# Patient Record
Sex: MALE | Race: WHITE | NOT HISPANIC OR LATINO | Employment: OTHER | URBAN - METROPOLITAN AREA
[De-identification: names, ages, dates, MRNs, and addresses within clinical notes are randomized per-mention and may not be internally consistent; named-entity substitution may affect disease eponyms.]

---

## 2018-12-10 ENCOUNTER — APPOINTMENT (OUTPATIENT)
Dept: CT IMAGING | Facility: CLINIC | Age: 68
End: 2018-12-10
Attending: EMERGENCY MEDICINE
Payer: MEDICARE

## 2018-12-10 ENCOUNTER — HOSPITAL ENCOUNTER (EMERGENCY)
Facility: CLINIC | Age: 68
Discharge: HOME | End: 2018-12-10
Attending: EMERGENCY MEDICINE | Admitting: EMERGENCY MEDICINE
Payer: MEDICARE

## 2018-12-10 ENCOUNTER — APPOINTMENT (OUTPATIENT)
Dept: GENERAL RADIOLOGY | Facility: CLINIC | Age: 68
End: 2018-12-10
Attending: EMERGENCY MEDICINE
Payer: MEDICARE

## 2018-12-10 VITALS
DIASTOLIC BLOOD PRESSURE: 70 MMHG | SYSTOLIC BLOOD PRESSURE: 110 MMHG | OXYGEN SATURATION: 100 % | WEIGHT: 174.16 LBS | TEMPERATURE: 98 F | HEART RATE: 71 BPM | RESPIRATION RATE: 16 BRPM

## 2018-12-10 DIAGNOSIS — N28.9 RENAL INSUFFICIENCY: ICD-10-CM

## 2018-12-10 DIAGNOSIS — R55 SYNCOPE, UNSPECIFIED SYNCOPE TYPE: ICD-10-CM

## 2018-12-10 DIAGNOSIS — J18.9 PNEUMONIA OF LEFT LUNG DUE TO INFECTIOUS ORGANISM, UNSPECIFIED PART OF LUNG: ICD-10-CM

## 2018-12-10 DIAGNOSIS — E86.0 DEHYDRATION: ICD-10-CM

## 2018-12-10 LAB
ALBUMIN UR-MCNC: NEGATIVE MG/DL
ANION GAP SERPL CALCULATED.3IONS-SCNC: 7 MMOL/L (ref 3–14)
APPEARANCE UR: CLEAR
BASOPHILS # BLD AUTO: 0 10E9/L (ref 0–0.2)
BASOPHILS NFR BLD AUTO: 0.2 %
BILIRUB UR QL STRIP: NEGATIVE
BUN SERPL-MCNC: 21 MG/DL (ref 7–30)
CALCIUM SERPL-MCNC: 9.1 MG/DL (ref 8.5–10.1)
CHLORIDE SERPL-SCNC: 101 MMOL/L (ref 94–109)
CO2 SERPL-SCNC: 24 MMOL/L (ref 20–32)
COLOR UR AUTO: YELLOW
CREAT SERPL-MCNC: 1.63 MG/DL (ref 0.66–1.25)
D DIMER PPP FEU-MCNC: 5.1 UG/ML FEU (ref 0–0.5)
DIFFERENTIAL METHOD BLD: ABNORMAL
EOSINOPHIL # BLD AUTO: 0.1 10E9/L (ref 0–0.7)
EOSINOPHIL NFR BLD AUTO: 1.3 %
ERYTHROCYTE [DISTWIDTH] IN BLOOD BY AUTOMATED COUNT: 12.4 % (ref 10–15)
GFR SERPL CREATININE-BSD FRML MDRD: 42 ML/MIN/1.7M2
GLUCOSE SERPL-MCNC: 96 MG/DL (ref 70–99)
GLUCOSE UR STRIP-MCNC: NEGATIVE MG/DL
HCT VFR BLD AUTO: 39.1 % (ref 40–53)
HGB BLD-MCNC: 13.2 G/DL (ref 13.3–17.7)
HGB UR QL STRIP: NEGATIVE
HYALINE CASTS #/AREA URNS LPF: 16 /LPF (ref 0–2)
IMM GRANULOCYTES # BLD: 0 10E9/L (ref 0–0.4)
IMM GRANULOCYTES NFR BLD: 0.3 %
KETONES UR STRIP-MCNC: NEGATIVE MG/DL
LEUKOCYTE ESTERASE UR QL STRIP: ABNORMAL
LYMPHOCYTES # BLD AUTO: 0.5 10E9/L (ref 0.8–5.3)
LYMPHOCYTES NFR BLD AUTO: 5.2 %
MAGNESIUM SERPL-MCNC: 2.3 MG/DL (ref 1.6–2.3)
MCH RBC QN AUTO: 32.4 PG (ref 26.5–33)
MCHC RBC AUTO-ENTMCNC: 33.8 G/DL (ref 31.5–36.5)
MCV RBC AUTO: 96 FL (ref 78–100)
MONOCYTES # BLD AUTO: 1.1 10E9/L (ref 0–1.3)
MONOCYTES NFR BLD AUTO: 11.4 %
MUCOUS THREADS #/AREA URNS LPF: PRESENT /LPF
NEUTROPHILS # BLD AUTO: 7.6 10E9/L (ref 1.6–8.3)
NEUTROPHILS NFR BLD AUTO: 81.6 %
NITRATE UR QL: NEGATIVE
NRBC # BLD AUTO: 0 10*3/UL
NRBC BLD AUTO-RTO: 0 /100
NT-PROBNP SERPL-MCNC: 77 PG/ML (ref 0–900)
PH UR STRIP: 6 PH (ref 5–7)
PLATELET # BLD AUTO: 252 10E9/L (ref 150–450)
POTASSIUM SERPL-SCNC: 5.2 MMOL/L (ref 3.4–5.3)
RBC # BLD AUTO: 4.07 10E12/L (ref 4.4–5.9)
RBC #/AREA URNS AUTO: 1 /HPF (ref 0–2)
SODIUM SERPL-SCNC: 132 MMOL/L (ref 133–144)
SOURCE: ABNORMAL
SP GR UR STRIP: 1.01 (ref 1–1.03)
SQUAMOUS #/AREA URNS AUTO: <1 /HPF (ref 0–1)
TROPONIN I BLD-MCNC: 0.01 UG/L (ref 0–0.08)
TROPONIN I SERPL-MCNC: <0.015 UG/L (ref 0–0.04)
UROBILINOGEN UR STRIP-MCNC: 0 MG/DL (ref 0–2)
WBC # BLD AUTO: 9.3 10E9/L (ref 4–11)
WBC #/AREA URNS AUTO: 12 /HPF (ref 0–5)

## 2018-12-10 PROCEDURE — 96361 HYDRATE IV INFUSION ADD-ON: CPT

## 2018-12-10 PROCEDURE — 84484 ASSAY OF TROPONIN QUANT: CPT | Performed by: EMERGENCY MEDICINE

## 2018-12-10 PROCEDURE — 25000128 H RX IP 250 OP 636: Performed by: EMERGENCY MEDICINE

## 2018-12-10 PROCEDURE — 85025 COMPLETE CBC W/AUTO DIFF WBC: CPT | Performed by: EMERGENCY MEDICINE

## 2018-12-10 PROCEDURE — 71260 CT THORAX DX C+: CPT

## 2018-12-10 PROCEDURE — 83880 ASSAY OF NATRIURETIC PEPTIDE: CPT | Performed by: EMERGENCY MEDICINE

## 2018-12-10 PROCEDURE — 70450 CT HEAD/BRAIN W/O DYE: CPT

## 2018-12-10 PROCEDURE — 93005 ELECTROCARDIOGRAM TRACING: CPT

## 2018-12-10 PROCEDURE — 87086 URINE CULTURE/COLONY COUNT: CPT | Performed by: EMERGENCY MEDICINE

## 2018-12-10 PROCEDURE — 80048 BASIC METABOLIC PNL TOTAL CA: CPT | Performed by: EMERGENCY MEDICINE

## 2018-12-10 PROCEDURE — 81001 URINALYSIS AUTO W/SCOPE: CPT | Performed by: EMERGENCY MEDICINE

## 2018-12-10 PROCEDURE — 85379 FIBRIN DEGRADATION QUANT: CPT | Performed by: EMERGENCY MEDICINE

## 2018-12-10 PROCEDURE — 96360 HYDRATION IV INFUSION INIT: CPT | Mod: 59

## 2018-12-10 PROCEDURE — 71046 X-RAY EXAM CHEST 2 VIEWS: CPT

## 2018-12-10 PROCEDURE — 99285 EMERGENCY DEPT VISIT HI MDM: CPT | Mod: 25

## 2018-12-10 PROCEDURE — 84484 ASSAY OF TROPONIN QUANT: CPT

## 2018-12-10 PROCEDURE — 83735 ASSAY OF MAGNESIUM: CPT | Performed by: EMERGENCY MEDICINE

## 2018-12-10 RX ORDER — LISINOPRIL 10 MG/1
10 TABLET ORAL
COMMUNITY
Start: 2018-10-02

## 2018-12-10 RX ORDER — FLUTICASONE PROPIONATE 50 MCG
2 SPRAY, SUSPENSION (ML) NASAL
COMMUNITY
Start: 2016-04-01

## 2018-12-10 RX ORDER — MULTIVIT-MINERALS/FOLIC ACID 200 MCG
TABLET,CHEWABLE ORAL
COMMUNITY
Start: 2016-05-07

## 2018-12-10 RX ORDER — ASPIRIN 81 MG/1
81 TABLET, CHEWABLE ORAL
COMMUNITY
Start: 2016-05-07

## 2018-12-10 RX ORDER — ALBUTEROL SULFATE 90 UG/1
2 AEROSOL, METERED RESPIRATORY (INHALATION)
COMMUNITY
Start: 2018-08-03

## 2018-12-10 RX ORDER — LEVOFLOXACIN 750 MG/1
750 TABLET, FILM COATED ORAL DAILY
Qty: 5 TABLET | Refills: 0 | Status: SHIPPED | OUTPATIENT
Start: 2018-12-10 | End: 2018-12-15

## 2018-12-10 RX ORDER — OMEPRAZOLE 40 MG/1
40 CAPSULE, DELAYED RELEASE ORAL
COMMUNITY
Start: 2018-10-02

## 2018-12-10 RX ORDER — ATORVASTATIN CALCIUM 20 MG/1
20 TABLET, FILM COATED ORAL
COMMUNITY
Start: 2018-10-02

## 2018-12-10 RX ORDER — IOPAMIDOL 755 MG/ML
500 INJECTION, SOLUTION INTRAVASCULAR ONCE
Status: COMPLETED | OUTPATIENT
Start: 2018-12-10 | End: 2018-12-10

## 2018-12-10 RX ADMIN — SODIUM CHLORIDE 91 ML: 9 INJECTION, SOLUTION INTRAVENOUS at 20:24

## 2018-12-10 RX ADMIN — SODIUM CHLORIDE 1000 ML: 9 INJECTION, SOLUTION INTRAVENOUS at 18:38

## 2018-12-10 RX ADMIN — SODIUM CHLORIDE 1000 ML: 9 INJECTION, SOLUTION INTRAVENOUS at 20:58

## 2018-12-10 RX ADMIN — IOPAMIDOL 72 ML: 755 INJECTION, SOLUTION INTRAVENOUS at 20:24

## 2018-12-10 ASSESSMENT — ENCOUNTER SYMPTOMS
NUMBNESS: 0
CHILLS: 0
ACTIVITY CHANGE: 1
DYSURIA: 0
NAUSEA: 1
ABDOMINAL PAIN: 0
VOMITING: 0
WEAKNESS: 0
SEIZURES: 0
HEADACHES: 0
SHORTNESS OF BREATH: 0
BLOOD IN STOOL: 0
APPETITE CHANGE: 1
PALPITATIONS: 0
DIFFICULTY URINATING: 0
FATIGUE: 1
FEVER: 1

## 2018-12-10 NOTE — ED PROVIDER NOTES
"  History     Chief Complaint:  Fall and loss of consciousness    HPI   Angel Moore is a 68 year old male who presents with loss of consciousness and fall. The patient states that over the past 3 previous weeks he has been traveling extensively around [place] ([place] and [place]). He returned home approximately 3 days ago and states he has felt fatigued upon returning, attributing it to jet lag. Last night, around 2200, the patient states he had a few alcoholic drinks and when he got up to go into the living room from his kitchen he began to feel tired and mildly lightheaded. Suddenly, the patient states he lost consciousness without much warning and fell. This was not witnessed but his wife was nearby and heard a large crash. She found the patient on the ground with no reports of seizure activity. The patient believes that he struck his chin on the furniture. He came to consciousness fairly quickly and has since been ambulatory without significant symptoms such as confusion or headache. However, he has had some pain at his chin, as well as his arm where he believes he fell on top of it. They called his clinic today to schedule an appointment but was referred here given his injury. The patient denies any preceding symptoms to his syncope apart from some mild lightheadedness. He had no chest pain or shortness of breath prior to it as well.  He denies fevers, chills, cough, shortness of breath, chest pain, leg swelling, nausea, vomiting, abdominal pain, black/bloody stools or urinary symptoms. Notably, the patient endorses drinking 3-4 whiskey drinks a day. Likewise he notes that he was walking around significantly on his trip and has lost ~10 pounds of weight over the past 3 weeks.  He also notes \"the air quality there was very poor.\" No history blood thinners.    Allergies:  Erythromycin      Medications:    Ventolin  Aspirin  Lipitor  Flonase  Lisinopril  Omeprazole    Past Medical History:  "   COPD  Hypertension    Past Surgical History:    History reviewed. No pertinent surgical history.     Family History:    History reviewed. No pertinent family history.      Social History:  Smoking Status: Former Smoker  Alcohol Use: Yes, 3-4 drinks per night  Patient presents with wife.   Marital Status:        Review of Systems   Constitutional: Positive for activity change, appetite change, fatigue and fever. Negative for chills.   Respiratory: Negative for shortness of breath.    Cardiovascular: Negative for chest pain, palpitations and leg swelling.   Gastrointestinal: Positive for nausea. Negative for abdominal pain, blood in stool and vomiting.   Genitourinary: Negative for difficulty urinating and dysuria.   Musculoskeletal: Positive for arthralgias (arm pain).   Skin: Positive for color change (bruising to chin).   Neurological: Positive for syncope (yesterday). Negative for seizures, weakness, numbness and headaches.   All other systems reviewed and are negative.      Physical Exam   First Vitals:  BP: 108/63  Pulse: 88  Heart Rate: 77  Temp: 98  F (36.7  C)  Resp: 16  Weight: 79 kg (174 lb 2.6 oz)  SpO2: 98 %      Physical Exam  Nursing note and vitals reviewed.  Constitutional: Well nourished. Resting comfortably.   Eyes: Conjunctiva normal.  Pupils are equal, round, and reactive to light.   ENT: Nose normal. Mucous membranes pink and moist. TM normal.     Neck: Normal range of motion.  CVS: Normal rate, regular rhythm.  Normal heart sounds.  No murmur.  Pulmonary: Mild bibasilar rhonchi.  GI: Abdomen soft. Nontender, nondistended. No rigidity or guarding.    MSK: No calf tenderness or swelling. All extremities without reproducible tenderness. No c/t/l bony tenderness.   Neuro: Awake, alert. Speech is normal and fluent. Face is symmetric. EOMI. PERRL. Moves all extremities. Normal finger-nose-finger.  strength equal bilaterally. Equal sensation bilaterally on UE/LE and face. No arm or leg  drift. Gait stable   Skin: Skin is warm and dry. No rash noted. Mild bruising and soft tissue swelling to R. Lower chin  Psychiatric: Normal affect.       Emergency Department Course     ECG (16:59:20):  Rate 76 bpm. KY interval 168. QRS duration 82. QT/QTc 356/400. P-R-T axes 56 65 60. Normal sinus rhythm. Normal ECG. Interpreted by Shante Yuen DO.     Imaging:  Radiographic findings were communicated with the patient who voiced understanding of the findings.    CT-scan Chest PE  w/ contrast:  1. No pulmonary embolism demonstrated.  2. No thoracic aortic dissection or aneurysm.   3. Infiltrate in the posterolateral left upper lobe.  4. Small bilateral pulmonary nodules measuring up to 5 mm.  As read by Radiology.      X-ray Chest, 2 views:  No acute disease.   Result per radiology.     CT-scan Head w/o contrast:  No evidence of acute intracranial hemorrhage, mass, or  herniation.  Result per radiology.      Laboratory:  1838 -Troponin: <0.015   2055 -Troponin POCT: 0.01  BNP: 77  D-dimer: 5.1   CBC: HGB 13.2 (L), o/w WNL (WBC 9.3, )   BMP: Na 132 (H), GFR 42 (L), o/w WNL (Creatinine 1.63 (H)     UA: Trace leukocyte esterase, WBC 12, Mucous present, hyaline casts 16, o/w negative    Interventions:  1838 - NS 1L IV Bolus   2058 - NS 1L IV Bolus     Emergency Department Course:  Past medical records, nursing notes, and vitals reviewed.  I performed an exam of the patient and obtained history, as documented above.     IV inserted and blood drawn.     The patient was sent for a CT-scan and X-ray while in the emergency department, findings above.      2157: I rechecked the patient. Findings and plan explained to the Patient. Patient discharged home with instructions regarding supportive care, medications, and reasons to return. The importance of close follow-up was reviewed.      Impression & Plan      Medical Decision Making:  Angel Moore is a 68 year old male who presents with a history and clinical  exam consistent with syncope.  Patient underwent an extensive workup in the ED.  EKG without focal ischemia or underlying arrhythmia. Troponin neg x 2.  CT without evidence of PE though noted L. Sided infiltrate.  UA suggests mild infection, though patient denies any dysuria/abdominal pain, will defer antibiotics pending formal culture.  Labs without significant electrolyte derangements though noted acute on chronic renal insufficiency with baseline Cr around 1.2, today 1.6.  I suspect this is secondary to mild dehydration.  The patient received 2L IVF during time in the ED.  H/H stable.  There are no signs of a concerning etiology for syncope at this point.  In addition, there is no family history of sudden death, no chest pain, no seizure activity or post-ictal period, no murmur, and no focal neurologic symptoms and no complaints of concerning headache.  Patient requesting discharge home on reevaluation.  He has remained hemodynamically stable in the ED and is not hypoxic.  He will be discharged home on levoquin given pneumonia.  He is not septic appearing and is agreeable to close outpatient f/u.  I also have ordered a echo for the patient to f/u with his PCP outpatient.   Instructed to return to the ED should patient develop chest pain, weakness, or should symptoms worsen/change.        Diagnosis:    ICD-10-CM   1. Pneumonia of left lung due to infectious organism, unspecified part of lung J18.9       2. Syncope, unspecified syncope type R55   3. Renal insufficiency N28.9   4. Dehydration E86.0       Disposition:  Discharged to home    Discharge Medications:  START taking      Dose / Directions   levofloxacin 750 MG tablet  Commonly known as:  LEVAQUIN      Dose:  750 mg  Take 1 tablet (750 mg) by mouth daily for 5 days  Quantity:  5 tablet  Refills:  0          Manny Kam  12/10/2018   [facility] EMERGENCY DEPARTMENT  IManny am serving as a scribe at 9:56 PM on 12/10/2018 to  document services personally performed by Dr. Yuen based on my observations and the provider's statements to me.           Shante Yuen, DO  12/11/18 0931

## 2018-12-10 NOTE — ED AVS SNAPSHOT
[facility] Emergency Department  [address]  [facility] [address]  Phone:  [phone]  Fax:  [phone]                                    Angel Moore   MRN: [id number]    Department:  [facility] Emergency Department   Date of Visit:  12/10/2018           After Visit Summary Signature Page    I have received my discharge instructions, and my questions have been answered. I have discussed any challenges I see with this plan with the nurse or doctor.    ..........................................................................................................................................  Patient/Patient Representative Signature      ..........................................................................................................................................  Patient Representative Print Name and Relationship to Patient    ..................................................               ................................................  Date                                   Time    ..........................................................................................................................................  Reviewed by Signature/Title    ...................................................              ..............................................  Date                                               Time          22EPIC Rev 08/18

## 2018-12-10 NOTE — ED AVS SNAPSHOT
[facility] EMERGENCY DEPARTMENT: [phone]                                              INTERAGENCY TRANSFER FORM - LAB / IMAGING / EKG / EMG RESULTS   12/10/2018                   Hospital Admission Date: 12/10/2018  ALANA IZQUIERDO   : 1950  Sex: Male         Attending Provider:  Shante Yuen DO    Allergies:  Erythromycin    Infection:  None   Service:  EMERGENCY ME    Ht:  --   Wt:  79 kg (174 lb 2.6 oz)   Admission Wt:  79 kg (174 lb 2.6 oz)    BMI:  --   BSA:  --            Patient PCP Information     Provider PCP Type    [facility]         Lab Results - 3 Days      UA with Microscopic [[id number]] (Abnormal)  Resulted: 12/10/18 2107, Result status: Final result   Ordering provider:  Shante Yuen DO  12/10/18 1756 Resulting lab:  [facility]    Specimen Information    Type Source Collected On   Midstream Urine   12/10/18 1958          Components    Component Value Reference Range Flag Lab   Color Urine Yellow     FrRdHs   Appearance Urine Clear     FrRdHs   Glucose Urine Negative NEG^Negative mg/dL   FrRdHs   Bilirubin Urine Negative NEG^Negative   FrRdHs   Ketones Urine Negative NEG^Negative mg/dL   FrRdHs   Specific Gravity Urine 1.009 1.003 - 1.035   FrRdHs   Blood Urine Negative NEG^Negative   FrRdHs   pH Urine 6.0 5.0 - 7.0 pH   FrRdHs   Protein Albumin Urine Negative NEG^Negative mg/dL   FrRdHs   Urobilinogen mg/dL 0.0 0.0 - 2.0 mg/dL   FrRdHs   Nitrite Urine Negative NEG^Negative   FrRdHs   Leukocyte Esterase Urine Trace NEG^Negative A FrRdHs   Source Midstream Urine     FrRdHs   WBC Urine 12 0 - 5 /HPF H FrRdHs   RBC Urine 1 0 - 2 /HPF   FrRdHs   Squamous Epithelial /HPF Urine <1 0 - 1 /HPF   FrRdHs   Mucous Urine Present NEG^Negative /LPF A FrRdHs   Hyaline Casts 16 0 - 2 /LPF H FrRdHs            D dimer quantitative [[id number]] (Abnormal)  Resulted: 12/10/18 1951, Result status: Final result   Ordering provider:  Shante Yuen  DO  12/10/18 1756 Resulting lab:  [facility]    Specimen Information    Type Source Collected On   Blood   12/10/18 1838          Components    Component Value Reference Range Flag Lab   D Dimer 5.1 0.0 - 0.50 ug/ml FEU [facility]   Comment:         This D-dimer assay is intended for use in conjunction with a clinical pretest   probability assessment model to exclude pulmonary embolism (PE) and deep   venous thrombosis (DVT) in outpatients suspected of PE or DVT. The cut-off   value is 0.5 ug/mL FEU.              Troponin I [[id number]]  Resulted: 12/10/18 1917, Result status: Final result   Ordering provider:  Shante Yuen, DO  12/10/18 1756 Resulting lab:  [facility]    Specimen Information    Type Source Collected On   Blood   12/10/18 1838          Components    Component Value Reference Range Flag Lab   Troponin I ES <0.015 0.000 - 0.045 ug/L   [facility]   Comment:         The 99th percentile for upper reference range is 0.045 ug/L.  Troponin values   in the range of 0.045 - 0.120 ug/L may be associated with risks of adverse   clinical events.              BNP [[id number]]  Resulted: 12/10/18 1917, Result status: Final result   Ordering provider:  Shante Yuen,   12/10/18 1756 Resulting lab:  [facility]    Specimen Information    Type Source Collected On   Blood   12/10/18 1838          Components    Component Value Reference Range Flag Lab   N-Terminal Pro BNP Inpatient 77 0 - 900 pg/mL   [facility]   Comment:            Reference range shown and results flagged as abnormal are suggested inpatient   cut points for confirming diagnosis if CHF in an acute setting. Establishing a   baseline value for each individual patient is useful for follow-up. An   inpatient or emergency department NT-proPBNP <300 pg/mL effectively rules out   acute CHF, with 99% negative predictive value.  The outpatient non-acute reference range for ruling out CHF is:   0-125 pg/mL (age 18 to  less than 75)   0-450 pg/mL (age 75 yrs and older)              Magnesium [[id number]]  Resulted: 12/10/18 1917, Result status: Final result   Ordering provider:  Shante Yuen,   12/10/18 1756 Resulting lab:  [facility]    Specimen Information    Type Source Collected On   Blood   12/10/18 1838          Components    Component Value Reference Range Flag Lab   Magnesium 2.3 1.6 - 2.3 mg/dL   FrRdHs            Basic metabolic panel [[id number]] (Abnormal)  Resulted: 12/10/18 1917, Result status: Final result   Ordering provider:  Shante Yuen,   12/10/18 1756 Resulting lab:  [facility]    Specimen Information    Type Source Collected On   Blood   12/10/18 1838          Components    Component Value Reference Range Flag Lab   Sodium 132 133 - 144 mmol/L  FrRdHs   Potassium 5.2 3.4 - 5.3 mmol/L   FrRdHs   Chloride 101 94 - 109 mmol/L   FrRdHs   Carbon Dioxide 24 20 - 32 mmol/L   FrRdHs   Anion Gap 7 3 - 14 mmol/L   FrRdHs   Glucose 96 70 - 99 mg/dL   FrRdHs   Urea Nitrogen 21 7 - 30 mg/dL   FrRdHs   Creatinine 1.63 0.66 - 1.25 mg/dL H FrRdHs   GFR Estimate 42 >60 mL/min/1.7m2  FrRdHs   Comment:  Non  GFR Calc   GFR Estimate If Black 51 >60 mL/min/1.7m2  FrRdHs   Comment:  African American GFR Calc   Calcium 9.1 8.5 - 10.1 mg/dL   FrRdHs            CBC with platelets differential [[id number]] (Abnormal)  Resulted: 12/10/18 1857, Result status: Final result   Ordering provider:  Shante Yuen,   12/10/18 1756 Resulting lab:  [facility]    Specimen Information    Type Source Collected On   Blood   12/10/18 1838          Components    Component Value Reference Range Flag Lab   WBC 9.3 4.0 - 11.0 10e9/L   FrRdHs   RBC Count 4.07 4.4 - 5.9 10e12/L  FrRdHs   Hemoglobin 13.2 13.3 - 17.7 g/dL  FrRdHs   Hematocrit 39.1 40.0 - 53.0 %  FrRdHs   MCV 96 78 - 100 fl   FrRdHs   MCH 32.4 26.5 - 33.0 pg   FrRdHs   MCHC 33.8 31.5 - 36.5 g/dL   FrRdHs   RDW  12.4 10.0 - 15.0 %   FrRdHs   Platelet Count 252 150 - 450 10e9/L   FrRdHs   Diff Method Automated Method     FrRdHs   % Neutrophils 81.6 %   FrRdHs   % Lymphocytes 5.2 %   FrRdHs   % Monocytes 11.4 %   FrRdHs   % Eosinophils 1.3 %   FrRdHs   % Basophils 0.2 %   FrRdHs   % Immature Granulocytes 0.3 %   FrRdHs   Nucleated RBCs 0 0 /100   FrRdHs   Absolute Neutrophil 7.6 1.6 - 8.3 10e9/L   FrRdHs   Absolute Lymphocytes 0.5 0.8 - 5.3 10e9/L  FrRdHs   Absolute Monocytes 1.1 0.0 - 1.3 10e9/L   FrRdHs   Absolute Eosinophils 0.1 0.0 - 0.7 10e9/L   FrRdHs   Absolute Basophils 0.0 0.0 - 0.2 10e9/L   FrRdHs   Abs Immature Granulocytes 0.0 0 - 0.4 10e9/L   FrRdHs   Absolute Nucleated RBC 0.0     FrRdHs            Testing Performed By     Lab - Abbreviation Name Director Address Valid Date Range    12 - FrRdHs [facility] Unknown [address] [facility] [address] 05/08/15 1057 - Present            Unresulted Labs (24h ago, onward)    Start       Ordered    12/10/18 2130  Urine Culture Aerobic Bacterial  ADD-ON,   Add-On      12/10/18 2129    12/10/18 2129  Nt probnp inpatient  ADD-ON,   Add-On      12/10/18 2128         Imaging Results - 3 Days      CT Chest Pulmonary Embolism w Contrast [[id number]]  Resulted: 12/10/18 2108, Result status: Final result   Ordering provider:  Shante Yuen DO  12/10/18 1957 Resulted by:  Leroy Biggs MD   Performed:  12/10/18 2019 - 12/10/18 2048 Accession number:  [id number]   Resulting lab:  RADIOLOGY RESULTS   Narrative:  CT CHEST PULMONARY EMBOLISM WITH CONTRAST  12/10/2018 8:48 PM     HISTORY: Pulmonary embolism suspected, intermediate problem, positive  D-dimer. Chest pain.      COMPARISON: None.    TECHNIQUE: Volumetric helical acquisition of CT images of the chest  from the lung apices to the kidneys were acquired after the  administration of 72mL Isovue-370  IV contrast. Radiation dose for  this scan was reduced using automated exposure control,  adjustment of  the mA and/or kV according to patient size, or iterative  reconstruction technique.    FINDINGS: There is no pulmonary embolism. Emphysema. Posterior left  upper lobe infiltrate. 4 mm posterolateral left lower lobe nodule  image 169. 4 mm nodule in the posterior right upper lobe image 92.  Nodule adjacent to this, measuring 4 mm on image 97. These could be  granulomas. Nodule along the right major fissure measuring 5 mm. This  is on image 146. The heart is not enlarged. Thoracic aorta is  atherosclerotic without evidence of dissection or aneurysm. There is  no pleural or pericardial effusion. There is no pneumothorax. Adrenal  glands are normal. Remainder of the visualized upper abdomen is  unremarkable.     Impression:  IMPRESSION:   1. No pulmonary embolism demonstrated.  2. No thoracic aortic dissection or aneurysm.   3. Infiltrate in the posterolateral left upper lobe.  4. Small bilateral pulmonary nodules measuring up to 5 mm.    Recommendations for one or multiple incidental lung nodules < 6mm :    Low risk patients: No routine follow-up.    High risk patients: Optional follow-up CT at 12 months; if  unchanged, no further follow-up.    *Low Risk: Minimal or absent history of smoking or other known risk  factors.  *Nonsolid (ground glass) or partly solid nodules may require longer  follow-up to exclude indolent adenocarcinoma.  *Recommendations based on Guidelines for the Management of Incidental  Pulmonary Nodules Detected at CT: From the Fleischner Society 2017,  Radiology 2017.    NOAH BIGGS MD      XR Chest 2 Views [[id number]]  Resulted: 12/10/18 1957, Result status: Final result   Ordering provider:  Shante Yuen DO  12/10/18 1756 Resulted by:  Noah Biggs MD   Performed:  12/10/18 1921 - 12/10/18 1926 Accession number:  [id number]   Resulting lab:  RADIOLOGY RESULTS   Narrative:  CHEST TWO VIEWS 12/10/2018 7:26 PM     HISTORY: Syncope.    COMPARISON: August 7, 2014.      FINDINGS: There are no acute infiltrates. The cardiac silhouette is  not enlarged. Pulmonary vasculature is unremarkable.     Impression:  IMPRESSION: No acute disease.     NOAH CALLAHAN MD      CT Head w/o Contrast [[id number]]  Resulted: 12/10/18 1925, Result status: Final result   Ordering provider:  Shante Yuen DO  12/10/18 1756 Resulted by:  Krishna Singleton MD   Performed:  12/10/18 1912 - 12/10/18 1918 Accession number:  [id number]   Resulting lab:  RADIOLOGY RESULTS   Narrative:  CT SCAN OF THE HEAD WITHOUT CONTRAST   12/10/2018 7:18 PM     HISTORY: Altered level of consciousness; syncope.    TECHNIQUE:  Axial images of the head and coronal reformations without  IV contrast material. Radiation dose for this scan was reduced using  automated exposure control, adjustment of the mA and/or kV according  to patient size, or iterative reconstruction technique.    COMPARISON: None.    FINDINGS: There is no evidence of intracranial hemorrhage, mass, acute  infarct or anomaly. The ventricles are normal in size, shape and  configuration. The brain parenchyma and subarachnoid spaces are  normal.     The visualized portions of the sinuses and mastoids appear normal. The  bony calvarium and bones of the skull base appear intact.      Impression:  IMPRESSION:   No evidence of acute intracranial hemorrhage, mass, or  herniation.          KRISHNA SINGLETON MD      Testing Performed By     Lab - Abbreviation Name Director Address Valid Date Range    104 - Rad Rslts RADIOLOGY RESULTS Unknown Unknown 02/16/05 1553 - Present            Encounter-Level Documents:    There are no encounter-level documents.     Order-Level Documents:    There are no order-level documents.

## 2018-12-10 NOTE — ED TRIAGE NOTES
[place]ing [place] for the past 3 weeks.  Came home on Friday and has been fatigued with jet lag.  Last night pt was walking in kitchen and had a syncope.  Pt has a swelling to the lower right chin and pain in the right arm.  And hip.  Pt is alert and oriented.

## 2018-12-11 LAB — INTERPRETATION ECG - MUSE: NORMAL

## 2018-12-11 ASSESSMENT — ENCOUNTER SYMPTOMS
COLOR CHANGE: 1
ARTHRALGIAS: 1

## 2018-12-11 NOTE — RESULT ENCOUNTER NOTE
Emergency Dept discharge antibiotic (if prescribed): Levofloxacin (Levaquin) 750 mg PO tablet,  1 tablet (750 mg) by mouth once daily for 5 days. (pneumonia)  Date of Rx (if applicable):  12/10/18  No changes in treatment per Urine culture protocol.

## 2018-12-12 LAB
BACTERIA SPEC CULT: NORMAL
Lab: NORMAL
SPECIMEN SOURCE: NORMAL

## 2018-12-12 NOTE — RESULT ENCOUNTER NOTE
Final urine culture report is NEGATIVE per [place] ED Lab Result protocol.    If NEGATIVE result, no change in treatment, per [facility] ED Lab Result protocol.

## 2018-12-19 ENCOUNTER — HOSPITAL ENCOUNTER (OUTPATIENT)
Dept: CARDIOLOGY | Facility: CLINIC | Age: 68
Discharge: HOME | End: 2018-12-19
Attending: EMERGENCY MEDICINE | Admitting: EMERGENCY MEDICINE
Payer: MEDICARE

## 2018-12-19 DIAGNOSIS — R55 SYNCOPE, UNSPECIFIED SYNCOPE TYPE: ICD-10-CM

## 2018-12-19 PROCEDURE — 93018 CV STRESS TEST I&R ONLY: CPT | Performed by: INTERNAL MEDICINE

## 2018-12-19 PROCEDURE — 93325 DOPPLER ECHO COLOR FLOW MAPG: CPT | Mod: TC

## 2018-12-19 PROCEDURE — 93325 DOPPLER ECHO COLOR FLOW MAPG: CPT | Mod: 26 | Performed by: INTERNAL MEDICINE

## 2018-12-19 PROCEDURE — 93350 STRESS TTE ONLY: CPT | Mod: 26 | Performed by: INTERNAL MEDICINE

## 2018-12-19 PROCEDURE — 93016 CV STRESS TEST SUPVJ ONLY: CPT | Performed by: INTERNAL MEDICINE

## 2018-12-19 PROCEDURE — 25500064 ZZH RX 255 OP 636: Performed by: EMERGENCY MEDICINE

## 2018-12-19 PROCEDURE — 93321 DOPPLER ECHO F-UP/LMTD STD: CPT | Mod: 26 | Performed by: INTERNAL MEDICINE

## 2018-12-19 RX ADMIN — HUMAN ALBUMIN MICROSPHERES AND PERFLUTREN 6 ML: 10; .22 INJECTION, SOLUTION INTRAVENOUS at 11:07

## 2019-09-29 ENCOUNTER — HEALTH MAINTENANCE LETTER (OUTPATIENT)
Age: 69
End: 2019-09-29

## 2020-03-15 ENCOUNTER — HEALTH MAINTENANCE LETTER (OUTPATIENT)
Age: 70
End: 2020-03-15

## 2021-01-14 ENCOUNTER — HEALTH MAINTENANCE LETTER (OUTPATIENT)
Age: 71
End: 2021-01-14

## 2021-03-01 ENCOUNTER — IMMUNIZATION (OUTPATIENT)
Dept: NURSING | Facility: CLINIC | Age: 71
End: 2021-03-01
Payer: COMMERCIAL

## 2021-03-01 PROCEDURE — 91301 PR COVID VAC MODERNA 100 MCG/0.5 ML IM: CPT

## 2021-03-01 PROCEDURE — 0011A PR COVID VAC MODERNA 100 MCG/0.5 ML IM: CPT

## 2021-03-29 ENCOUNTER — IMMUNIZATION (OUTPATIENT)
Dept: NURSING | Facility: CLINIC | Age: 71
End: 2021-03-29
Attending: INTERNAL MEDICINE
Payer: COMMERCIAL

## 2021-03-29 PROCEDURE — 0012A PR COVID VAC MODERNA 100 MCG/0.5 ML IM: CPT

## 2021-03-29 PROCEDURE — 91301 PR COVID VAC MODERNA 100 MCG/0.5 ML IM: CPT

## 2021-05-08 ENCOUNTER — HEALTH MAINTENANCE LETTER (OUTPATIENT)
Age: 71
End: 2021-05-08

## 2021-08-03 PROCEDURE — 87075 CULTR BACTERIA EXCEPT BLOOD: CPT | Mod: ORL | Performed by: ORTHOPAEDIC SURGERY

## 2021-08-03 PROCEDURE — 87070 CULTURE OTHR SPECIMN AEROBIC: CPT | Mod: ORL | Performed by: ORTHOPAEDIC SURGERY

## 2021-10-23 ENCOUNTER — HEALTH MAINTENANCE LETTER (OUTPATIENT)
Age: 71
End: 2021-10-23

## 2022-02-10 ENCOUNTER — LAB REQUISITION (OUTPATIENT)
Dept: LAB | Facility: CLINIC | Age: 72
End: 2022-02-10
Payer: COMMERCIAL

## 2022-02-11 LAB
BACTERIA SPEC CULT: NO GROWTH
BACTERIA SPEC CULT: NORMAL

## 2022-05-16 ENCOUNTER — HOSPITAL ENCOUNTER (EMERGENCY)
Facility: CLINIC | Age: 72
Discharge: HOME | End: 2022-05-16
Attending: PHYSICIAN ASSISTANT | Admitting: PHYSICIAN ASSISTANT
Payer: COMMERCIAL

## 2022-05-16 ENCOUNTER — APPOINTMENT (OUTPATIENT)
Dept: GENERAL RADIOLOGY | Facility: CLINIC | Age: 72
End: 2022-05-16
Attending: PHYSICIAN ASSISTANT
Payer: COMMERCIAL

## 2022-05-16 VITALS
HEART RATE: 72 BPM | OXYGEN SATURATION: 97 % | RESPIRATION RATE: 16 BRPM | SYSTOLIC BLOOD PRESSURE: 139 MMHG | TEMPERATURE: 98 F | DIASTOLIC BLOOD PRESSURE: 79 MMHG

## 2022-05-16 DIAGNOSIS — R07.9 CHEST PAIN, UNSPECIFIED TYPE: ICD-10-CM

## 2022-05-16 DIAGNOSIS — R07.9 CHEST PAIN: ICD-10-CM

## 2022-05-16 LAB
ANION GAP SERPL CALCULATED.3IONS-SCNC: 8 MMOL/L (ref 3–14)
ATRIAL RATE - MUSE: 74 BPM
BASOPHILS # BLD AUTO: 0 10E3/UL (ref 0–0.2)
BASOPHILS NFR BLD AUTO: 0 %
BUN SERPL-MCNC: 18 MG/DL (ref 7–30)
CALCIUM SERPL-MCNC: 9.1 MG/DL (ref 8.5–10.1)
CHLORIDE BLD-SCNC: 107 MMOL/L (ref 94–109)
CO2 SERPL-SCNC: 23 MMOL/L (ref 20–32)
CREAT SERPL-MCNC: 0.99 MG/DL (ref 0.66–1.25)
D DIMER PPP FEU-MCNC: 0.3 UG/ML FEU (ref 0–0.5)
DIASTOLIC BLOOD PRESSURE - MUSE: NORMAL MMHG
EOSINOPHIL # BLD AUTO: 0.1 10E3/UL (ref 0–0.7)
EOSINOPHIL NFR BLD AUTO: 1 %
ERYTHROCYTE [DISTWIDTH] IN BLOOD BY AUTOMATED COUNT: 13.1 % (ref 10–15)
GFR SERPL CREATININE-BSD FRML MDRD: 81 ML/MIN/1.73M2
GLUCOSE BLD-MCNC: 121 MG/DL (ref 70–99)
HCT VFR BLD AUTO: 40.1 % (ref 40–53)
HGB BLD-MCNC: 13.4 G/DL (ref 13.3–17.7)
HOLD SPECIMEN: NORMAL
HOLD SPECIMEN: NORMAL
IMM GRANULOCYTES # BLD: 0.1 10E3/UL
IMM GRANULOCYTES NFR BLD: 0 %
INTERPRETATION ECG - MUSE: NORMAL
LYMPHOCYTES # BLD AUTO: 0.6 10E3/UL (ref 0.8–5.3)
LYMPHOCYTES NFR BLD AUTO: 6 %
MCH RBC QN AUTO: 32.4 PG (ref 26.5–33)
MCHC RBC AUTO-ENTMCNC: 33.4 G/DL (ref 31.5–36.5)
MCV RBC AUTO: 97 FL (ref 78–100)
MONOCYTES # BLD AUTO: 1.8 10E3/UL (ref 0–1.3)
MONOCYTES NFR BLD AUTO: 16 %
NEUTROPHILS # BLD AUTO: 8.6 10E3/UL (ref 1.6–8.3)
NEUTROPHILS NFR BLD AUTO: 77 %
NRBC # BLD AUTO: 0 10E3/UL
NRBC BLD AUTO-RTO: 0 /100
P AXIS - MUSE: 66 DEGREES
PLATELET # BLD AUTO: 168 10E3/UL (ref 150–450)
POTASSIUM BLD-SCNC: 3.8 MMOL/L (ref 3.4–5.3)
PR INTERVAL - MUSE: 172 MS
QRS DURATION - MUSE: 84 MS
QT - MUSE: 392 MS
QTC - MUSE: 435 MS
R AXIS - MUSE: 67 DEGREES
RBC # BLD AUTO: 4.13 10E6/UL (ref 4.4–5.9)
SODIUM SERPL-SCNC: 138 MMOL/L (ref 133–144)
SYSTOLIC BLOOD PRESSURE - MUSE: NORMAL MMHG
T AXIS - MUSE: 72 DEGREES
TROPONIN I SERPL HS-MCNC: 4 NG/L
TROPONIN I SERPL HS-MCNC: 4 NG/L
VENTRICULAR RATE- MUSE: 74 BPM
WBC # BLD AUTO: 11.1 10E3/UL (ref 4–11)

## 2022-05-16 PROCEDURE — 80048 BASIC METABOLIC PNL TOTAL CA: CPT | Performed by: PHYSICIAN ASSISTANT

## 2022-05-16 PROCEDURE — 85025 COMPLETE CBC W/AUTO DIFF WBC: CPT | Performed by: PHYSICIAN ASSISTANT

## 2022-05-16 PROCEDURE — 99285 EMERGENCY DEPT VISIT HI MDM: CPT | Mod: 25

## 2022-05-16 PROCEDURE — 71046 X-RAY EXAM CHEST 2 VIEWS: CPT

## 2022-05-16 PROCEDURE — 93005 ELECTROCARDIOGRAM TRACING: CPT

## 2022-05-16 PROCEDURE — 250N000013 HC RX MED GY IP 250 OP 250 PS 637: Performed by: PHYSICIAN ASSISTANT

## 2022-05-16 PROCEDURE — 36415 COLL VENOUS BLD VENIPUNCTURE: CPT | Performed by: PHYSICIAN ASSISTANT

## 2022-05-16 PROCEDURE — 85379 FIBRIN DEGRADATION QUANT: CPT | Performed by: PHYSICIAN ASSISTANT

## 2022-05-16 PROCEDURE — 84484 ASSAY OF TROPONIN QUANT: CPT | Performed by: PHYSICIAN ASSISTANT

## 2022-05-16 RX ORDER — ASPIRIN 81 MG/1
162 TABLET, CHEWABLE ORAL ONCE
Status: COMPLETED | OUTPATIENT
Start: 2022-05-16 | End: 2022-05-16

## 2022-05-16 RX ORDER — NITROGLYCERIN 0.4 MG/1
0.4 TABLET SUBLINGUAL EVERY 5 MIN PRN
Status: DISCONTINUED | OUTPATIENT
Start: 2022-05-16 | End: 2022-05-16 | Stop reason: HOSPADM

## 2022-05-16 RX ADMIN — ASPIRIN 81 MG CHEWABLE TABLET 162 MG: 81 TABLET CHEWABLE at 11:51

## 2022-05-16 RX ADMIN — NITROGLYCERIN 0.4 MG: 0.4 TABLET SUBLINGUAL at 11:52

## 2022-05-16 ASSESSMENT — ENCOUNTER SYMPTOMS
DIARRHEA: 0
NAUSEA: 0
SORE THROAT: 0
ABDOMINAL PAIN: 0
SHORTNESS OF BREATH: 0
VOMITING: 0
FEVER: 0
CHILLS: 0
RHINORRHEA: 0

## 2022-05-16 NOTE — ED PROVIDER NOTES
History   Chief Complaint:  Chest Pain       The history is provided by the patient and the spouse.      Angel Moore is a 71 year old male with history of COPD, hypertension, and hyperlipidemia who presents with chest pain. The patient reports chest pain that is worse when laying down or upon inhalation. He states it began around 2000 last night and has been constant since then. He describes the chest pain as a mid sternal dull ache and states it does not radiate anywhere. The patient states he did a lot of physical activity yesterday and also twisted around once yesterday and felt as if he pulled a muscle. The patient states he used an inhaler for his symptoms both yesterday and today. The patient reports taking an at home Covid-19 test today that was negative. The patient denies any personal history of heart failure, diabetes, or high cholesterol. Wife reports a family history of diabetes. The patient denies any fever, chills, runny nose, congestion, or sore throat. He denies shortness of breath at baseline today. He reports no leg swelling, abdominal pain, nausea, vomiting, or diarrhea. Declines neb.    Stress Echo 2018:  Interpretation Summary  The patient exercised 5 minutes.  The patient exhibited no chest pain during exercise.  The EKG portion of this stress test was negative for inducible ischemia (see  echo results below).  Normal resting wall motion and no stress-induced wall motion abnormality.  This was a normal stress echocardiogram with no evidence of stress-induced  ischemia.    Review of Systems   Constitutional: Negative for chills and fever.   HENT: Negative for congestion, rhinorrhea and sore throat.    Respiratory: Negative for shortness of breath.    Cardiovascular: Positive for chest pain. Negative for leg swelling.   Gastrointestinal: Negative for abdominal pain, diarrhea, nausea and vomiting.   All other systems reviewed and are  negative.      Allergies:  Erythromycin    Medications:  albuterol (VENTOLIN HFA) 108 (90 Base) MCG/ACT inhaler  aspirin (ASA) 81 MG chewable tablet  atorvastatin (LIPITOR) 20 MG tablet  fluticasone-salmeterol (ADVAIR DISKUS) 100-50 MCG/DOSE inhaler  lisinopril (PRINIVIL/ZESTRIL) 10 MG tablet  omeprazole (PRILOSEC) 40 MG DR capsule  Probiotic Product ([employer] PROBIOTIC MAXIMUM STRENGTH) CAPS  umeclidinium (INCRUSE ELLIPTA) 62.5 MCG/INH inhaler  Glucosamine-Chondroitin (OSTEO BI-FLEX) 250-200 mg tablet    vitamin B complex (B-COMPLEX VITAMIN) tablet   amLODIPine (NORVASC) 5 mg tablet    allopurinoL (ZYLOPRIM) 100 mg tablet    escitalopram oxalate (LEXAPRO) 10 mg tablet      Past Medical History:     COPD   Hypertension   Lipoma of other skin and subcutaneous tissue   Colonic polyps   Hyperlipidemia  Psoriasis  Spirochetal infection     Past Surgical History:    Colonoscopy  Lipoma resection     Family History:    Father: diabetes, COPD  Mother: hypertension     Social History:  The patient presents to the emergency department with his wife.   The patient arrived to the emergency department via car.   The patient has a history of tobacco use disorder.     Physical Exam     Patient Vitals for the past 24 hrs:   BP Temp Temp src Pulse Resp SpO2   05/16/22 1450 -- -- -- -- -- 97 %   05/16/22 1445 -- -- -- 72 -- 95 %   05/16/22 1440 -- -- -- 67 -- 95 %   05/16/22 1430 -- -- -- 70 -- 95 %   05/16/22 1400 -- -- -- 68 -- 93 %   05/16/22 1330 139/79 -- -- 69 -- 94 %   05/16/22 1255 -- -- -- 68 -- 93 %   05/16/22 1250 125/78 -- -- 63 -- 93 %   05/16/22 1245 130/76 -- -- 65 -- 95 %   05/16/22 1240 131/71 -- -- 68 -- 93 %   05/16/22 1235 134/74 -- -- 65 -- 95 %   05/16/22 1230 131/76 -- -- 66 -- 94 %   05/16/22 1225 135/72 -- -- 65 -- 96 %   05/16/22 1200 128/70 -- -- 73 -- 92 %   05/16/22 1151 -- 98  F (36.7  C) Oral -- 16 --   05/16/22 1150 129/76 -- -- 67 -- 95 %   05/16/22 1145 135/78 -- -- 70 -- 94 %   05/16/22 1130 -- -- -- --  -- 97 %   05/16/22 1115 (!) 152/87 -- -- -- -- 93 %   05/16/22 1114 -- -- -- 75 -- --       Physical Exam  Constitutional: Pleasant. Cooperative.   Eyes: Pupils equally round and reactive  HENT: Head is normal in appearance. Oropharynx is normal with moist mucus membranes.  Cardiovascular: Regular rate and rhythm and without murmurs.  Respiratory: Normal respiratory effort, lungs are clear bilaterally.  GI: Abdomen is soft, non-tender, non-distended. No guarding, rebound, or rigidity.  Musculoskeletal: No asymmetry of the lower extremities, no tenderness to palpation.   Skin: Normal, without rash.  Neurologic: Cranial nerves grossly intact, normal cognition, no focal deficits. Alert and oriented x 3.   Psychiatric: Normal affect.  Nursing notes and vital signs reviewed.    Emergency Department Course   ECG  ECG taken at 1017, ECG read at 1029  Normal sinus rhythm    No significant change, as compared to prior, dated 12/19/18.  Rate 74 bpm. PA interval 172 ms. QRS duration 84 ms. QT/QTc 392/435 ms. P-R-T axes 66 67 72.    Imaging:  XR Chest 2 Views   Final Result   IMPRESSION: No radiographic evidence of acute chest abnormality.       WALTER CHRISTIANSON MD            SYSTEM ID:  [id number]        Laboratory:  Labs Ordered and Resulted from Time of ED Arrival to Time of ED Departure   BASIC METABOLIC PANEL - Abnormal       Result Value    Sodium 138      Potassium 3.8      Chloride 107      Carbon Dioxide (CO2) 23      Anion Gap 8      Urea Nitrogen 18      Creatinine 0.99      Calcium 9.1      Glucose 121 (*)     GFR Estimate 81     CBC WITH PLATELETS AND DIFFERENTIAL - Abnormal    WBC Count 11.1 (*)     RBC Count 4.13 (*)     Hemoglobin 13.4      Hematocrit 40.1      MCV 97      MCH 32.4      MCHC 33.4      RDW 13.1      Platelet Count 168      % Neutrophils 77      % Lymphocytes 6      % Monocytes 16      % Eosinophils 1      % Basophils 0      % Immature Granulocytes 0      NRBCs per 100 WBC 0      Absolute  Neutrophils 8.6 (*)     Absolute Lymphocytes 0.6 (*)     Absolute Monocytes 1.8 (*)     Absolute Eosinophils 0.1      Absolute Basophils 0.0      Absolute Immature Granulocytes 0.1      Absolute NRBCs 0.0     TROPONIN I - Normal    Troponin I High Sensitivity 4     D DIMER QUANTITATIVE - Normal    D-Dimer Quantitative 0.30     TROPONIN I - Normal    Troponin I High Sensitivity 4        Emergency Department Course:    Reviewed:  I reviewed nursing notes, vitals, past medical history and Care Everywhere      HEART Score  Background  Calculates the overall risk of adverse event in patient's presenting with chest pain.  Based on 5 criteria (each assigned 0-2 points) including suspiciousness of history, EKG, age, risk factors and troponin.    Data  71 year old male  does not have a problem list on file.   reports that he has quit smoking. He has never used smokeless tobacco.  family history is not on file.  Lab Results   Component Value Date    TROPI <0.015 12/10/2018     Criteria   0-2 points for each of 5 items (maximum of 10 points):  Score 0- History slightly suspicious for coronary syndrome  Score 0- EKG Normal  Score 2- Age 65 years or older  Score 1- One to 2 risk factors for atherosclerotic disease  Score 0- Within normal limits for troponin levels  Interpretation  Risk of adverse outcome  Heart Score: 3  Total Score 0-3- Adverse Outcome Risk 2.5% - Supports early discharge with appropriate follow-up    Assessments:  1117 I obtained history and examined the patient as noted above.    I rechecked the patient and explained findings.     Interventions:  Medications   aspirin (ASA) chewable tablet 162 mg (162 mg Oral Given 5/16/22 1151)     Disposition:  The patient was discharged to home.     Impression & Plan     Medical Decision Making:  Angel Moore is a 71 year old male who presents to ED for evaluation of chest pain.  This began after patient turned around quickly to grab his dog who was attempting to jump  out of the car and has been constant since that time.  See HPI as above for additional details.  Vitals and physical exam as above differential is broad include ACS, dissection, PE, pneumothorax, GERD, MSK, pericarditis, pneumonia, among others.  Work-up obtained as above.  Discussed with patient unclear etiology of his symptoms at this time.  Delta troponin are negative and patient has had 6+ hours of symptoms.  D-dimer is negative, making PE less likely.  Chest x-ray is negative for acute abnormality.  ECG reassuring as above.  HEART score of 3. Nevertheless, felt that patient required close outpatient f/u. Patient in agreement with this plan. Discussed reasons to return. All questions answered. Patient discharged to home in stable condition.    Diagnosis:    ICD-10-CM    1. Chest pain  R07.9        Discharge Medications:  None    Scribe Disclosure:  Silke RANGEL, am serving as a scribe at 11:17 AM on 5/16/2022 to document services personally performed by Krishna Dunn PA-C based on my observations and the provider's statements to me.     This record was created at least in part using electronic voice recognition software, so please excuse any typographical errors.         Krishna Dunn PA-C  05/16/22 2003

## 2022-05-16 NOTE — ED TRIAGE NOTES
High stress day yesterday. SOB and chest pain when laying down last night. Had difficulty getting up.

## 2022-06-04 ENCOUNTER — HEALTH MAINTENANCE LETTER (OUTPATIENT)
Age: 72
End: 2022-06-04

## 2022-10-10 ENCOUNTER — HEALTH MAINTENANCE LETTER (OUTPATIENT)
Age: 72
End: 2022-10-10

## 2023-05-25 ENCOUNTER — APPOINTMENT (OUTPATIENT)
Dept: CT IMAGING | Facility: CLINIC | Age: 73
End: 2023-05-25
Payer: COMMERCIAL

## 2023-05-25 ENCOUNTER — HOSPITAL ENCOUNTER (EMERGENCY)
Facility: CLINIC | Age: 73
Discharge: HOME | End: 2023-05-25
Attending: EMERGENCY MEDICINE | Admitting: EMERGENCY MEDICINE
Payer: COMMERCIAL

## 2023-05-25 VITALS
RESPIRATION RATE: 18 BRPM | TEMPERATURE: 98.2 F | DIASTOLIC BLOOD PRESSURE: 70 MMHG | OXYGEN SATURATION: 92 % | SYSTOLIC BLOOD PRESSURE: 136 MMHG | HEART RATE: 58 BPM | WEIGHT: 178.35 LBS

## 2023-05-25 DIAGNOSIS — S09.90XA CLOSED HEAD INJURY, INITIAL ENCOUNTER: Primary | ICD-10-CM

## 2023-05-25 DIAGNOSIS — H11.31 SUBCONJUNCTIVAL HEMORRHAGE OF RIGHT EYE: ICD-10-CM

## 2023-05-25 DIAGNOSIS — F10.920 ALCOHOLIC INTOXICATION WITHOUT COMPLICATION (H): ICD-10-CM

## 2023-05-25 LAB
ALBUMIN SERPL BCG-MCNC: 4 G/DL (ref 3.5–5.2)
ALP SERPL-CCNC: 105 U/L (ref 40–129)
ALT SERPL W P-5'-P-CCNC: 20 U/L (ref 10–50)
ANION GAP SERPL CALCULATED.3IONS-SCNC: 15 MMOL/L (ref 7–15)
AST SERPL W P-5'-P-CCNC: 28 U/L (ref 10–50)
BASOPHILS # BLD AUTO: 0 10E3/UL (ref 0–0.2)
BASOPHILS NFR BLD AUTO: 1 %
BILIRUB SERPL-MCNC: 0.4 MG/DL
BUN SERPL-MCNC: 14.8 MG/DL (ref 8–23)
CALCIUM SERPL-MCNC: 8.9 MG/DL (ref 8.8–10.2)
CHLORIDE SERPL-SCNC: 109 MMOL/L (ref 98–107)
CREAT SERPL-MCNC: 1 MG/DL (ref 0.67–1.17)
DEPRECATED HCO3 PLAS-SCNC: 21 MMOL/L (ref 22–29)
EOSINOPHIL # BLD AUTO: 0.2 10E3/UL (ref 0–0.7)
EOSINOPHIL NFR BLD AUTO: 3 %
ERYTHROCYTE [DISTWIDTH] IN BLOOD BY AUTOMATED COUNT: 13 % (ref 10–15)
ETHANOL SERPL-MCNC: 0.15 G/DL
GFR SERPL CREATININE-BSD FRML MDRD: 80 ML/MIN/1.73M2
GLUCOSE SERPL-MCNC: 85 MG/DL (ref 70–99)
HCT VFR BLD AUTO: 38.5 % (ref 40–53)
HGB BLD-MCNC: 13.1 G/DL (ref 13.3–17.7)
IMM GRANULOCYTES # BLD: 0 10E3/UL
IMM GRANULOCYTES NFR BLD: 0 %
LYMPHOCYTES # BLD AUTO: 0.7 10E3/UL (ref 0.8–5.3)
LYMPHOCYTES NFR BLD AUTO: 13 %
MCH RBC QN AUTO: 32.7 PG (ref 26.5–33)
MCHC RBC AUTO-ENTMCNC: 34 G/DL (ref 31.5–36.5)
MCV RBC AUTO: 96 FL (ref 78–100)
MONOCYTES # BLD AUTO: 0.7 10E3/UL (ref 0–1.3)
MONOCYTES NFR BLD AUTO: 12 %
NEUTROPHILS # BLD AUTO: 4 10E3/UL (ref 1.6–8.3)
NEUTROPHILS NFR BLD AUTO: 71 %
NRBC # BLD AUTO: 0 10E3/UL
NRBC BLD AUTO-RTO: 0 /100
PLATELET # BLD AUTO: 179 10E3/UL (ref 150–450)
POTASSIUM SERPL-SCNC: 3.5 MMOL/L (ref 3.4–5.3)
PROT SERPL-MCNC: 6.5 G/DL (ref 6.4–8.3)
RBC # BLD AUTO: 4.01 10E6/UL (ref 4.4–5.9)
SODIUM SERPL-SCNC: 145 MMOL/L (ref 136–145)
TROPONIN T SERPL HS-MCNC: 13 NG/L
WBC # BLD AUTO: 5.7 10E3/UL (ref 4–11)

## 2023-05-25 PROCEDURE — 36415 COLL VENOUS BLD VENIPUNCTURE: CPT

## 2023-05-25 PROCEDURE — 93005 ELECTROCARDIOGRAM TRACING: CPT

## 2023-05-25 PROCEDURE — 84484 ASSAY OF TROPONIN QUANT: CPT

## 2023-05-25 PROCEDURE — 85025 COMPLETE CBC W/AUTO DIFF WBC: CPT

## 2023-05-25 PROCEDURE — 99285 EMERGENCY DEPT VISIT HI MDM: CPT | Mod: 25

## 2023-05-25 PROCEDURE — 80053 COMPREHEN METABOLIC PANEL: CPT

## 2023-05-25 PROCEDURE — 82077 ASSAY SPEC XCP UR&BREATH IA: CPT

## 2023-05-25 PROCEDURE — 72125 CT NECK SPINE W/O DYE: CPT

## 2023-05-25 PROCEDURE — 70450 CT HEAD/BRAIN W/O DYE: CPT

## 2023-05-25 ASSESSMENT — VISUAL ACUITY
OD: 20/30
OU: 20/25
OS: 20/30

## 2023-05-25 ASSESSMENT — ACTIVITIES OF DAILY LIVING (ADL): ADLS_ACUITY_SCORE: 35

## 2023-05-26 LAB
ATRIAL RATE - MUSE: 59 BPM
DIASTOLIC BLOOD PRESSURE - MUSE: NORMAL MMHG
INTERPRETATION ECG - MUSE: NORMAL
P AXIS - MUSE: 37 DEGREES
PR INTERVAL - MUSE: 204 MS
QRS DURATION - MUSE: 78 MS
QT - MUSE: 442 MS
QTC - MUSE: 437 MS
R AXIS - MUSE: 61 DEGREES
SYSTOLIC BLOOD PRESSURE - MUSE: NORMAL MMHG
T AXIS - MUSE: 67 DEGREES
VENTRICULAR RATE- MUSE: 59 BPM

## 2023-05-26 NOTE — ED PROVIDER NOTES
History     Chief Complaint:  Fall       HPI   Angel Moore is a 72 year old male coagulated on Eliquis for atrial fibrillation with RVR presenting after a fall this evening.  Patient states that he had consumed approximately 4 alcoholic beverages.  He went for a walk with his wife.  He bent over to untangle the leash from his dog, lost consciousness, fell forward and hit his head.  He is unsure whether he lost consciousness before or after if his head.  He was unconscious for only several seconds before regaining consciousness.  He was able to get up and ambulate after the fall.  He did sustain an abrasion to the right forehead.  Patient states that he does drink quite heavily.  Reports drinking 4 to 5 glasses of whiskey daily, stating this was exacerbated during COVID.  He denies any prodromal symptoms relating to the fall including headache, chest pain, shortness of breath, neck pain.  He denies currently any vision changes, numbness, tingling, confusion, headache, chest pain, shortness of breath, shoulder pain, back pain, neck pain, pelvis pain.  He reports pain to the right eye.    Independent Historian:   Son is at patient's bedside and reports that the patient does drink heavily.    Review of External Notes:   Internal medicine office visit from 1/20/23     Medications:    albuterol (VENTOLIN HFA) 108 (90 Base) MCG/ACT inhaler  aspirin (ASA) 81 MG chewable tablet  atorvastatin (LIPITOR) 20 MG tablet  fluticasone (FLONASE) 50 MCG/ACT nasal spray  fluticasone-salmeterol (ADVAIR DISKUS) 100-50 MCG/DOSE inhaler  lisinopril (PRINIVIL/ZESTRIL) 10 MG tablet  omeprazole (PRILOSEC) 40 MG DR capsule  Probiotic Product ([employer] PROBIOTIC MAXIMUM STRENGTH) CAPS  umeclidinium (INCRUSE ELLIPTA) 62.5 MCG/INH inhaler      Past Medical History:    Past Medical History:   Diagnosis Date     COPD (chronic obstructive pulmonary disease) (H)      Past Surgical History:    No past surgical history on file.     Physical Exam      Patient Vitals for the past 24 hrs:   BP Temp Temp src Pulse Resp SpO2 Weight   05/25/23 2220 136/70 -- -- 58 -- 92 % --   05/25/23 2000 136/64 -- -- -- -- -- --   05/1950 (!) 140/74 98.2  F (36.8  C) Oral 64 18 91 % 80.9 kg (178 lb 5.6 oz)        Physical Exam   /70   Pulse 58   Temp 98.2  F (36.8  C) (Oral)   Resp 18   Wt 80.9 kg (178 lb 5.6 oz)   SpO2 92%    General: Well-developed and well-nourished. Patient appears comfortable.   Head: Abrasion to the right temple. No raccoon eyes or carbajal's sign.   EENT: PERRL. EOMI. No hemotympanum. Moist mucus membranes. Right subconjunctival hemorrhage No tongue abrasions, oral lacerations, or dental injury.   Neck: Trachea midline. No cervical spinous process tenderness.    CV: Regular rate and rhythm. No appreciable murmurs, rubs, or gallops.   Respiratory: Breathing comfortably on room air. Chest expansion is symmetric. No chest wall tenderness. Lungs clear to auscultation bilaterally without wheezes, rhonchi, or rales.    GI: Soft, non-distended. Non-tender abdomen. No rebound, rigidity, or guarding.   Msk:    Upper extremities: No tenderness to the shoulders, elbows, or wrists bilaterally. Able to fully range upper extremities. Sensation intact to light touch throughout. Strength 5/5 bilaterally.   Lower extremities: No tenderness to the knees or ankles bilaterally. Able to fully range lower extremities. Sensation intact to light touch throughout. Strength 5/5 bilaterally.   Pelvis: Stable. No tenderness with palpation of the pelvis. No lower extremity rotation or shortening.   Back: No thoracic or lumbar spinous process tenderness.   Skin: Warm and dry. No rashes.  Neuro: Awake, alert, and conversant. Oriented to person, place, and situation. CN II-XII grossly intact. Speech clear. Sensation intact to light touch and symmetrical. Strength 5/5 to bilateral upper and lower extremities. Normal finger nose finger. No pronator drift.   Psych:  Appropriate mood and affect.     Emergency Department Course     ECG results from 05/25/23   EKG 12-lead, tracing only     Value    Systolic Blood Pressure     Diastolic Blood Pressure     Ventricular Rate 59    Atrial Rate 59    NH Interval 204    QRS Duration 78        QTc 437    P Axis 37    R AXIS 61    T Axis 67    Interpretation ECG      Sinus bradycardia with sinus arrhythmia  Nonspecific ST abnormality  Abnormal ECG  When compared with ECG of 16-MAY-2022 10:17,  No significant change was found       Imaging:  Cervical spine CT w/o contrast   Final Result   IMPRESSION:   HEAD CT:   No acute intracranial process.      CERVICAL SPINE CT:   No CT evidence for acute fracture or post traumatic subluxation.         Head CT w/o contrast   Final Result   IMPRESSION:   HEAD CT:   No acute intracranial process.      CERVICAL SPINE CT:   No CT evidence for acute fracture or post traumatic subluxation.            Report per radiology    Laboratory:  Labs Ordered and Resulted from Time of ED Arrival to Time of ED Departure   COMPREHENSIVE METABOLIC PANEL - Abnormal       Result Value    Sodium 145      Potassium 3.5      Chloride 109 (*)     Carbon Dioxide (CO2) 21 (*)     Anion Gap 15      Urea Nitrogen 14.8      Creatinine 1.00      Calcium 8.9      Glucose 85      Alkaline Phosphatase 105      AST 28      ALT 20      Protein Total 6.5      Albumin 4.0      Bilirubin Total 0.4      GFR Estimate 80     ETHYL ALCOHOL LEVEL - Abnormal    Alcohol ethyl 0.15 (*)    CBC WITH PLATELETS AND DIFFERENTIAL - Abnormal    WBC Count 5.7      RBC Count 4.01 (*)     Hemoglobin 13.1 (*)     Hematocrit 38.5 (*)     MCV 96      MCH 32.7      MCHC 34.0      RDW 13.0      Platelet Count 179      % Neutrophils 71      % Lymphocytes 13      % Monocytes 12      % Eosinophils 3      % Basophils 1      % Immature Granulocytes 0      NRBCs per 100 WBC 0      Absolute Neutrophils 4.0      Absolute Lymphocytes 0.7 (*)     Absolute Monocytes  "0.7      Absolute Eosinophils 0.2      Absolute Basophils 0.0      Absolute Immature Granulocytes 0.0      Absolute NRBCs 0.0     TROPONIN T, HIGH SENSITIVITY - Normal    Troponin T, High Sensitivity 13        Emergency Department Course & Assessments:  Interventions:  Medications - No data to display     Assessments:   I performed my initial assessment of the patient.     Independent Interpretation (X-rays, CTs, rhythm strip):  None    Consultations/Discussion of Management or Tests:  Patient was seen in conjunction with attending physician Dr. Wills.     Social Determinants of Health affecting care:   None identified    Disposition:  The patient was discharged to home in the care of his son.    Impression & Plan    Medical Decision Makin-year-old male presenting here after a fall.  On initial exam, reported more of a syncopal event; however later, he seemed to think the event was more of a losing balance, falling forward, losing consciousness event.  Differential diagnosis included cardiac event, CVA, mechanical fall, fall because of alcohol intoxication.  Because of the uncertainty of the nature of the fall, did pursue a broad work-up including troponin, EKG, CBC, BMP.  With anticoagulation, intoxication, head injury, did obtain a head CT which was normal.  CT neck normal.  Evidence on labs for the cause of his fall.  Remainder of his trauma exam was negative, so I did not pursue any further imaging.    Son did express concerns for alcohol abuse.  I did offer the patient resources.  He declined any formal resources at this time.  He plans to seek \"informal\" treatment on his own.  He was given some resources in his print out.  I did discuss with him that drinking, especially while on Eliquis, could lead to a substantial and devastating head injury.  He understands this.    By patient having an alcohol level of 0.15, he is safe for discharge to home in the care of his son.  I discussed with him the " importance of outpatient follow-up with his primary care provider.  I also informed him that the subconjunctival hemorrhage will resolve on its own with time.     Diagnosis:    ICD-10-CM    1. Closed head injury, initial encounter  S09.90XA       2. Subconjunctival hemorrhage of right eye  H11.31       3. Alcoholic intoxication without complication (H)  F10.920            Libertad Felipe PA-C  05/25/23 2225

## 2023-05-26 NOTE — DISCHARGE INSTRUCTIONS
- Follow up with your primary care provider  - Can use Tylenol and/or ibuprofen for discomfort  - Subconjunctival hemorrhage will resolve on it's own over time    Discharge Instructions  Head Injury    You have been seen today for a head injury. Your evaluation included a history and physical examination. You may have had a CT (CAT) scan performed, though most head injuries do not require a scan. Based on this evaluation, your provider today does not feel that your head injury is serious.    Generally, every Emergency Department visit should have a follow-up clinic visit with either a primary or a specialty clinic/provider. Please follow-up as instructed by your emergency provider today.  Return to the Emergency Department if:  You are confused or you are not acting right.  Your headache gets worse or you start to have a really bad headache even with your recommended treatment plan.  You vomit (throw up) more than once.  You have a seizure.  You have trouble walking.  You have weakness or paralysis (cannot move) in an arm or a leg.  You have blood or fluid coming from your ears or nose.  You have new symptoms or anything that worries you.    Sleeping:  It is okay for you to sleep, but someone should wake you up if instructed by your provider, and someone should check on you at your usual time to wake up.     Activity:  Do not drive for at least 24 hours.  Do not drive if you have dizzy spells or trouble concentrating, or remembering things.  Do not return to any contact sports until cleared by your regular provider.     MORE INFORMATION:    Concussion:  A concussion is a minor head injury that may cause temporary problems with the way the brain works. Although concussions are important, they are generally not an emergency or a reason that a person needs to be hospitalized. Some concussion symptoms include confusion, amnesia (forgetful), nausea (sick to your stomach) and vomiting (throwing up), dizziness, fatigue,  memory or concentration problems, irritability and sleep problems. For most people, concussions are mild and temporary but some will have more severe and persistent symptoms that require on-going care and treatment.  CT Scans: Your evaluation today may have included a CT scan (CAT scan) to look for things like bleeding or a skull fracture (broken bone).  CT scans involve radiation and too many CT scans can cause serious health problems like cancer, especially in children.  Because of this, your provider may not have ordered a CT scan today if they think you are at low risk for a serious or life threatening problem.    If you were given a prescription for medicine here today, be sure to read all of the information (including the package insert) that comes with your prescription.  This will include important information about the medicine, its side effects, and any warnings that you need to know about.  The pharmacist who fills the prescription can provide more information and answer questions you may have about the medicine.  If you have questions or concerns that the pharmacist cannot address, please call or return to the Emergency Department.     Remember that you can always come back to the Emergency Department if you are not able to see your regular provider in the amount of time listed above, if you get any new symptoms, or if there is anything that worries you.      Discharge Instructions  Alcohol Intoxication    You have been seen today with alcohol intoxication. This means that you have enough alcohol in your system to impair your ability to mentally and physically function, perhaps to the extent that you were unable to care for yourself.    Generally, every Emergency Department visit should have a follow-up clinic visit with either a primary or a specialty clinic/provider. Please follow-up as instructed by your emergency provider today.    You may have come to the Emergency Department because of your  intoxication, or for another reason, such as because of an injury. No matter what the case is, this visit is a  red flag  regarding alcohol use, and you should consider whether your drinking pattern is a problem for you.     You may be at risk for alcohol-related problems if:    Men: you drink more than 14 drinks per week, or more than 4 drinks per occasion.    Women: you drink more than 7 drinks per week or more than 3 drinks per occasion.    You have black-outs.  You do things you regret while drinking.  You have legal problems because of drinking.  You have job problems because of drinking (you call in sick to work because of drinking).    CAGE Questions  Have you ever felt you should cut down on your drinking?  Have people annoyed you by criticizing your drinking?  Have you ever felt bad or guilty about your drinking?  Have you ever had a drink first thing in the morning to steady your nerves or get rid of a hangover (eye opener)?    If you answer yes to any of the CAGE questions, you may have a problem with alcohol.      Return to the Emergency Department if:  You become shaky or tremble when you try to stop drinking.   You have severe abdominal pain (belly pain).   You have a seizure or pass out.    You vomit (throw up) blood or have blood in your stool. This may be bright red or it may look like black coffee grounds.  You become lightheaded or faint.      For further help, contact:   Your caregiver.    Alcoholics Anonymous (AA).    [facility] Intergroup: [phone]  [facility] Central Office: [phone]   A drug or alcohol rehabilitation program.    You can get information on alcohol resources and groups by calling the number 211 or 1-694.861.6013 on any phone.     Seek medical care if:  You have persistent vomiting.   You have persistent pain in any part of your body.    You do not feel better after a few days.    If you were given a prescription for medicine here today, be sure  to read all of the information (including the package insert) that comes with your prescription.  This will include important information about the medicine, its side effects, and any warnings that you need to know about.  The pharmacist who fills the prescription can provide more information and answer questions you may have about the medicine.  If you have questions or concerns that the pharmacist cannot address, please call or return to the Emergency Department.   Remember that you can always come back to the Emergency Department if you are not able to see your regular doctor in the amount of time listed above, if you get any new symptoms, or if there is anything that worries you.

## 2023-05-26 NOTE — ED PROVIDER NOTES
ED ATTENDING PHYSICIAN NOTE:   I evaluated this patient in conjunction with Libertad Felipe PA-C  I have participated in the care of the patient and personally performed key elements of the history, exam, and medical decision making.      HPI:   Angel Moore is a 72 year old male coagulated on Eliquis for atrial fibrillation with RVR presenting after a fall this evening.  Patient states that he had consumed approximately 4 alcoholic beverages.  He went for a walk with his wife.  He bent over to untangle the leash from his dog, lost consciousness, fell forward and hit his head.  He is unsure whether he lost consciousness before or after if his head.  He was unconscious for only several seconds before regaining consciousness.  He was able to get up and ambulate after the fall.  He did sustain an abrasion to the right forehead. He denies any prodromal symptoms relating to the fall including headache, chest pain, shortness of breath, neck pain.  He denies currently any vision changes, numbness, tingling, confusion, headache, chest pain, shortness of breath, shoulder pain, back pain, neck pain, pelvis pain.  He reports pain to the right eye.    Independent Historian:   None - Patient Only    Review of External Notes: na      EXAM:     GENERAL: well developed, pleasant  HEAD: abrasion to right temple  EYES: pupils reactive, extraocular muscles intact, right subconjunctival hemorrhage   ENT:  mucus membranes moist  NECK:  trachea midline, normal range of motion  RESPIRATORY: no tachypnea, breath sounds clear to auscultation   CVS: normal S1/S2, no murmurs, intact distal pulses  ABDOMEN: soft, nontender, nondistention  MUSCULOSKELETAL: no deformities  SKIN: warm and dry, no acute rashes or ulceration  NEURO: GCS 15, cranial nerves intact, alert and oriented x3  PSYCH:  Mood/affect normal    ECG:  ECG taken at 2009, ECG read at 2014  Sinus brachycardia with sinus arrhythmia   Nonspecific ST abnormality   Abnormal ECG   Rate  59 bpm. ME interval 204 ms. QRS duration 78 ms. QT/QTc 442/437 ms. P-R-T axes 37 61 67.    Independent Interpretation (X-rays, CTs, rhythm strip):  None    Consultations/Discussion of Management or Tests:  None     Social Determinants of Health affecting care:   None     MEDICAL DECISION MAKING/ASSESSMENT AND PLAN:   Patient presents with fall and head injury.  Story is mixed in terms of syncope vs bending down and falling over with alcohol affecting his balance and memory.  Imaging is normal.  EKG is normal.  This overall sounds more consistent with intoxication and closed head injury.  Patient has a responsible adult to watch him.  He is safe for discharge.     DIAGNOSIS:     ICD-10-CM    1. Closed head injury, initial encounter  S09.90XA       2. Subconjunctival hemorrhage of right eye  H11.31       3. Alcoholic intoxication without complication (H)  F10.920                DISPOSITION:   home     Scribe Disclosure:  JUAN CARLOS Aviva Deweyhenry, am serving as a scribe at 10:51 PM on 5/25/2023 to document services personally performed by Praneeth Wills MD based on my observations and the provider's statements to me.      5/25/2023  [facility] EMERGENCY DEPT     Praneeth Wills MD  06/16/23 [id number]

## 2023-08-19 ENCOUNTER — HEALTH MAINTENANCE LETTER (OUTPATIENT)
Age: 73
End: 2023-08-19

## 2024-10-12 ENCOUNTER — HEALTH MAINTENANCE LETTER (OUTPATIENT)
Age: 74
End: 2024-10-12

## 2024-10-16 NOTE — ED TRIAGE NOTES
"Pt was walking dog after drinking, leaned over \"blacked out\" and landed hit head on R side. Denies LOC +thinner. Pt with blood in eye and scrapes to R temple. Of note family is asking for a blood alch level blood draw. ABCs intact A&Ox4      " No

## 2025-08-27 ENCOUNTER — HOSPITAL ENCOUNTER (EMERGENCY)
Facility: CLINIC | Age: 75
Discharge: HOME | End: 2025-08-28
Attending: EMERGENCY MEDICINE | Admitting: EMERGENCY MEDICINE
Payer: COMMERCIAL

## 2025-08-27 DIAGNOSIS — R55 SYNCOPE AND COLLAPSE: Primary | ICD-10-CM

## 2025-08-27 DIAGNOSIS — S02.85XA CLOSED FRACTURE OF ORBIT, INITIAL ENCOUNTER (H): ICD-10-CM

## 2025-08-27 DIAGNOSIS — S00.93XA CONTUSION OF HEAD, UNSPECIFIED PART OF HEAD, INITIAL ENCOUNTER: ICD-10-CM

## 2025-08-27 LAB
ALBUMIN SERPL BCG-MCNC: 4.1 G/DL (ref 3.5–5.2)
ALP SERPL-CCNC: 102 U/L (ref 40–150)
ALT SERPL W P-5'-P-CCNC: 25 U/L (ref 0–70)
ANION GAP SERPL CALCULATED.3IONS-SCNC: 13 MMOL/L (ref 7–15)
AST SERPL W P-5'-P-CCNC: 29 U/L (ref 0–45)
BASOPHILS # BLD AUTO: <0.03 10E3/UL (ref 0–0.2)
BASOPHILS NFR BLD AUTO: 0.6 %
BILIRUB SERPL-MCNC: 0.4 MG/DL
BUN SERPL-MCNC: 20.2 MG/DL (ref 8–23)
CALCIUM SERPL-MCNC: 8.9 MG/DL (ref 8.8–10.4)
CHLORIDE SERPL-SCNC: 105 MMOL/L (ref 98–107)
CREAT SERPL-MCNC: 1.15 MG/DL (ref 0.67–1.17)
EGFRCR SERPLBLD CKD-EPI 2021: 66 ML/MIN/1.73M2
EOSINOPHIL # BLD AUTO: 0.13 10E3/UL (ref 0–0.7)
EOSINOPHIL NFR BLD AUTO: 3.8 %
ERYTHROCYTE [DISTWIDTH] IN BLOOD BY AUTOMATED COUNT: 13.4 % (ref 10–15)
ETHANOL SERPL-MCNC: 0.11 G/DL
GLUCOSE BLDC GLUCOMTR-MCNC: 80 MG/DL (ref 70–99)
GLUCOSE SERPL-MCNC: 95 MG/DL (ref 70–99)
HCO3 SERPL-SCNC: 18 MMOL/L (ref 22–29)
HCT VFR BLD AUTO: 35.2 % (ref 40–53)
HGB BLD-MCNC: 12.4 G/DL (ref 13.3–17.7)
HOLD SPECIMEN: NORMAL
HOLD SPECIMEN: NORMAL
IMM GRANULOCYTES # BLD: <0.03 10E3/UL
IMM GRANULOCYTES NFR BLD: 0.6 %
LYMPHOCYTES # BLD AUTO: 0.77 10E3/UL (ref 0.8–5.3)
LYMPHOCYTES NFR BLD AUTO: 22.4 %
MAGNESIUM SERPL-MCNC: 1.9 MG/DL (ref 1.7–2.3)
MCH RBC QN AUTO: 33.4 PG (ref 26.5–33)
MCHC RBC AUTO-ENTMCNC: 35.2 G/DL (ref 31.5–36.5)
MCV RBC AUTO: 94.9 FL (ref 78–100)
MONOCYTES # BLD AUTO: 0.51 10E3/UL (ref 0–1.3)
MONOCYTES NFR BLD AUTO: 14.8 %
NEUTROPHILS # BLD AUTO: 1.99 10E3/UL (ref 1.6–8.3)
NEUTROPHILS NFR BLD AUTO: 57.8 %
NRBC # BLD AUTO: <0.03 10E3/UL
NRBC BLD AUTO-RTO: 0 /100
PLATELET # BLD AUTO: 132 10E3/UL (ref 150–450)
POTASSIUM SERPL-SCNC: 3.6 MMOL/L (ref 3.4–5.3)
PROT SERPL-MCNC: 7.1 G/DL (ref 6.4–8.3)
RBC # BLD AUTO: 3.71 10E6/UL (ref 4.4–5.9)
SODIUM SERPL-SCNC: 136 MMOL/L (ref 135–145)
TROPONIN T SERPL HS-MCNC: 14 NG/L
TROPONIN T SERPL HS-MCNC: 15 NG/L
WBC # BLD AUTO: 3.44 10E3/UL (ref 4–11)

## 2025-08-27 PROCEDURE — 82962 GLUCOSE BLOOD TEST: CPT

## 2025-08-27 PROCEDURE — 36415 COLL VENOUS BLD VENIPUNCTURE: CPT | Performed by: EMERGENCY MEDICINE

## 2025-08-27 PROCEDURE — 93005 ELECTROCARDIOGRAM TRACING: CPT | Performed by: EMERGENCY MEDICINE

## 2025-08-27 PROCEDURE — 85004 AUTOMATED DIFF WBC COUNT: CPT | Performed by: EMERGENCY MEDICINE

## 2025-08-27 PROCEDURE — 84155 ASSAY OF PROTEIN SERUM: CPT | Performed by: EMERGENCY MEDICINE

## 2025-08-27 PROCEDURE — 99285 EMERGENCY DEPT VISIT HI MDM: CPT | Mod: 25

## 2025-08-27 PROCEDURE — 258N000003 HC RX IP 258 OP 636: Performed by: EMERGENCY MEDICINE

## 2025-08-27 PROCEDURE — 83735 ASSAY OF MAGNESIUM: CPT | Performed by: EMERGENCY MEDICINE

## 2025-08-27 PROCEDURE — 96360 HYDRATION IV INFUSION INIT: CPT | Performed by: EMERGENCY MEDICINE

## 2025-08-27 PROCEDURE — 82077 ASSAY SPEC XCP UR&BREATH IA: CPT | Performed by: EMERGENCY MEDICINE

## 2025-08-27 PROCEDURE — 84484 ASSAY OF TROPONIN QUANT: CPT | Performed by: EMERGENCY MEDICINE

## 2025-08-27 PROCEDURE — 96361 HYDRATE IV INFUSION ADD-ON: CPT | Performed by: EMERGENCY MEDICINE

## 2025-08-27 RX ORDER — SACCHAROMYCES BOULARDII 250 MG
250 CAPSULE ORAL 2 TIMES DAILY
Qty: 28 CAPSULE | Refills: 0 | Status: SHIPPED | OUTPATIENT
Start: 2025-08-27 | End: 2025-09-10

## 2025-08-27 RX ADMIN — SODIUM CHLORIDE 1000 ML: 0.9 INJECTION, SOLUTION INTRAVENOUS at 20:57

## 2025-08-27 ASSESSMENT — ACTIVITIES OF DAILY LIVING (ADL)
ADLS_ACUITY_SCORE: 41

## 2025-08-28 VITALS
RESPIRATION RATE: 21 BRPM | HEART RATE: 67 BPM | SYSTOLIC BLOOD PRESSURE: 142 MMHG | TEMPERATURE: 98.4 F | DIASTOLIC BLOOD PRESSURE: 81 MMHG | OXYGEN SATURATION: 95 %

## 2025-08-28 LAB
ATRIAL RATE - MUSE: 64 BPM
DIASTOLIC BLOOD PRESSURE - MUSE: NORMAL MMHG
INTERPRETATION ECG - MUSE: NORMAL
P AXIS - MUSE: 63 DEGREES
PR INTERVAL - MUSE: 208 MS
QRS DURATION - MUSE: 86 MS
QT - MUSE: 446 MS
QTC - MUSE: 460 MS
R AXIS - MUSE: 66 DEGREES
SYSTOLIC BLOOD PRESSURE - MUSE: NORMAL MMHG
T AXIS - MUSE: 71 DEGREES
VENTRICULAR RATE- MUSE: 64 BPM